# Patient Record
Sex: FEMALE | Race: WHITE | NOT HISPANIC OR LATINO | Employment: UNEMPLOYED | URBAN - METROPOLITAN AREA
[De-identification: names, ages, dates, MRNs, and addresses within clinical notes are randomized per-mention and may not be internally consistent; named-entity substitution may affect disease eponyms.]

---

## 2017-02-21 ENCOUNTER — APPOINTMENT (OUTPATIENT)
Dept: RADIOLOGY | Facility: MEDICAL CENTER | Age: 32
End: 2017-02-21
Attending: EMERGENCY MEDICINE
Payer: COMMERCIAL

## 2017-02-21 ENCOUNTER — HOSPITAL ENCOUNTER (EMERGENCY)
Facility: MEDICAL CENTER | Age: 32
End: 2017-02-21
Attending: EMERGENCY MEDICINE
Payer: COMMERCIAL

## 2017-02-21 VITALS
WEIGHT: 199.08 LBS | HEIGHT: 64 IN | BODY MASS INDEX: 33.99 KG/M2 | OXYGEN SATURATION: 96 % | SYSTOLIC BLOOD PRESSURE: 102 MMHG | HEART RATE: 94 BPM | RESPIRATION RATE: 16 BRPM | DIASTOLIC BLOOD PRESSURE: 66 MMHG | TEMPERATURE: 98.4 F

## 2017-02-21 DIAGNOSIS — R10.11 RIGHT UPPER QUADRANT ABDOMINAL PAIN: ICD-10-CM

## 2017-02-21 LAB
ALBUMIN SERPL BCP-MCNC: 2.4 G/DL (ref 3.2–4.9)
ALBUMIN/GLOB SERPL: 0.8 G/DL
ALP SERPL-CCNC: 74 U/L (ref 30–99)
ALT SERPL-CCNC: 22 U/L (ref 2–50)
ANION GAP SERPL CALC-SCNC: 7 MMOL/L (ref 0–11.9)
APPEARANCE UR: CLEAR
AST SERPL-CCNC: 22 U/L (ref 12–45)
BASOPHILS # BLD AUTO: 0.2 % (ref 0–1.8)
BASOPHILS # BLD: 0.02 K/UL (ref 0–0.12)
BILIRUB SERPL-MCNC: 0.4 MG/DL (ref 0.1–1.5)
BILIRUB UR QL STRIP.AUTO: NEGATIVE
BUN SERPL-MCNC: 5 MG/DL (ref 8–22)
CALCIUM SERPL-MCNC: 8.8 MG/DL (ref 8.4–10.2)
CHLORIDE SERPL-SCNC: 102 MMOL/L (ref 96–112)
CO2 SERPL-SCNC: 25 MMOL/L (ref 20–33)
COLOR UR: YELLOW
CREAT SERPL-MCNC: 0.61 MG/DL (ref 0.5–1.4)
CULTURE IF INDICATED INDCX: NO UA CULTURE
EOSINOPHIL # BLD AUTO: 0.11 K/UL (ref 0–0.51)
EOSINOPHIL NFR BLD: 1.3 % (ref 0–6.9)
ERYTHROCYTE [DISTWIDTH] IN BLOOD BY AUTOMATED COUNT: 39.7 FL (ref 35.9–50)
GFR SERPL CREATININE-BSD FRML MDRD: >60 ML/MIN/1.73 M 2
GLOBULIN SER CALC-MCNC: 3.1 G/DL (ref 1.9–3.5)
GLUCOSE SERPL-MCNC: 105 MG/DL (ref 65–99)
GLUCOSE UR STRIP.AUTO-MCNC: NEGATIVE MG/DL
HCT VFR BLD AUTO: 35.3 % (ref 37–47)
HGB BLD-MCNC: 12.6 G/DL (ref 12–16)
IMM GRANULOCYTES # BLD AUTO: 0.08 K/UL (ref 0–0.11)
IMM GRANULOCYTES NFR BLD AUTO: 0.9 % (ref 0–0.9)
KETONES UR STRIP.AUTO-MCNC: NEGATIVE MG/DL
LEUKOCYTE ESTERASE UR QL STRIP.AUTO: NEGATIVE
LIPASE SERPL-CCNC: 37 U/L (ref 7–58)
LYMPHOCYTES # BLD AUTO: 1.19 K/UL (ref 1–4.8)
LYMPHOCYTES NFR BLD: 13.9 % (ref 22–41)
MCH RBC QN AUTO: 31.2 PG (ref 27–33)
MCHC RBC AUTO-ENTMCNC: 35.7 G/DL (ref 33.6–35)
MCV RBC AUTO: 87.4 FL (ref 81.4–97.8)
MICRO URNS: NORMAL
MONOCYTES # BLD AUTO: 0.37 K/UL (ref 0–0.85)
MONOCYTES NFR BLD AUTO: 4.3 % (ref 0–13.4)
NEUTROPHILS # BLD AUTO: 6.77 K/UL (ref 2–7.15)
NEUTROPHILS NFR BLD: 79.4 % (ref 44–72)
NITRITE UR QL STRIP.AUTO: NEGATIVE
NRBC # BLD AUTO: 0 K/UL
NRBC BLD AUTO-RTO: 0 /100 WBC
PH UR STRIP.AUTO: 5.5 [PH]
PLATELET # BLD AUTO: 178 K/UL (ref 164–446)
PMV BLD AUTO: 10.6 FL (ref 9–12.9)
POTASSIUM SERPL-SCNC: 3.4 MMOL/L (ref 3.6–5.5)
PROT SERPL-MCNC: 5.5 G/DL (ref 6–8.2)
PROT UR QL STRIP: NEGATIVE MG/DL
RBC # BLD AUTO: 4.04 M/UL (ref 4.2–5.4)
RBC UR QL AUTO: NEGATIVE
SODIUM SERPL-SCNC: 134 MMOL/L (ref 135–145)
SP GR UR STRIP.AUTO: 1.02
WBC # BLD AUTO: 8.5 K/UL (ref 4.8–10.8)

## 2017-02-21 PROCEDURE — 99284 EMERGENCY DEPT VISIT MOD MDM: CPT

## 2017-02-21 PROCEDURE — 76705 ECHO EXAM OF ABDOMEN: CPT

## 2017-02-21 PROCEDURE — 81003 URINALYSIS AUTO W/O SCOPE: CPT

## 2017-02-21 PROCEDURE — 80053 COMPREHEN METABOLIC PANEL: CPT

## 2017-02-21 PROCEDURE — 76815 OB US LIMITED FETUS(S): CPT

## 2017-02-21 PROCEDURE — 85025 COMPLETE CBC W/AUTO DIFF WBC: CPT

## 2017-02-21 PROCEDURE — 83690 ASSAY OF LIPASE: CPT

## 2017-02-21 ASSESSMENT — PAIN SCALES - GENERAL: PAINLEVEL_OUTOF10: 2

## 2017-02-21 NOTE — ED AVS SNAPSHOT
Home Care Instructions                                                                                                                Esther Gomez   MRN: [id number]    Department:  [facility], Emergency Dept   Date of Visit:  2/21/2017            [facility], Emergency Dept    [address]    [place] [address]    Phone:  [phone]      You were seen by     Cuong Iraheta M.D.      Your Diagnosis Was     Right upper quadrant abdominal pain     R10.11       Follow-up Information     1. Follow up with [facility], Emergency Dept.    Specialty:  Emergency Medicine    Why:  If symptoms worsen    Contact information    [address] [place] [address] [phone]      Medication Information     Review all of your home medications and newly ordered medications with your primary doctor and/or pharmacist as soon as possible. Follow medication instructions as directed by your doctor and/or pharmacist.     Please keep your complete medication list with you and share with your physician. Update the information when medications are discontinued, doses are changed, or new medications (including over-the-counter products) are added; and carry medication information at all times in the event of emergency situations.               Medication List      ASK your doctor about these medications        Instructions    PRENA-CAP PO    Take  by mouth.               Procedures and tests performed during your visit     CBC WITH DIFFERENTIAL    COMP METABOLIC PANEL    ESTIMATED GFR    LIPASE    URINALYSIS CULTURE, IF INDICATED    US-GALLBLADDER    US-OB LIMITED TRANSABDOMINAL        Discharge Instructions       Third Trimester of Pregnancy  The third trimester is from week 29 through week 42, months 7 through 9. The third trimester is a time when the fetus is growing rapidly. At the end of the ninth month, the fetus is about 20  "inches in length and weighs 6-10 pounds.   BODY CHANGES  Your body goes through many changes during pregnancy. The changes vary from woman to woman.   · Your weight will continue to increase. You can expect to gain 25-35 pounds (11-16 kg) by the end of the pregnancy.  · You may begin to get stretch marks on your hips, abdomen, and breasts.  · You may urinate more often because the fetus is moving lower into your pelvis and pressing on your bladder.  · You may develop or continue to have heartburn as a result of your pregnancy.  · You may develop constipation because certain hormones are causing the muscles that push waste through your intestines to slow down.  · You may develop hemorrhoids or swollen, bulging veins (varicose veins).  · You may have pelvic pain because of the weight gain and pregnancy hormones relaxing your joints between the bones in your pelvis. Backaches may result from overexertion of the muscles supporting your posture.  · You may have changes in your hair. These can include thickening of your hair, rapid growth, and changes in texture. Some women also have hair loss during or after pregnancy, or hair that feels dry or thin. Your hair will most likely return to normal after your baby is born.  · Your breasts will continue to grow and be tender. A yellow discharge may leak from your breasts called colostrum.  · Your belly button may stick out.  · You may feel short of breath because of your expanding uterus.  · You may notice the fetus \"dropping,\" or moving lower in your abdomen.  · You may have a bloody mucus discharge. This usually occurs a few days to a week before labor begins.  · Your cervix becomes thin and soft (effaced) near your due date.  WHAT TO EXPECT AT YOUR PRENATAL EXAMS   You will have prenatal exams every 2 weeks until week 36. Then, you will have weekly prenatal exams. During a routine prenatal visit:  · You will be weighed to make sure you and the fetus are growing " normally.  · Your blood pressure is taken.  · Your abdomen will be measured to track your baby's growth.  · The fetal heartbeat will be listened to.  · Any test results from the previous visit will be discussed.  · You may have a cervical check near your due date to see if you have effaced.  At around 36 weeks, your caregiver will check your cervix. At the same time, your caregiver will also perform a test on the secretions of the vaginal tissue. This test is to determine if a type of bacteria, Group B streptococcus, is present. Your caregiver will explain this further.  Your caregiver may ask you:  · What your birth plan is.  · How you are feeling.  · If you are feeling the baby move.  · If you have had any abnormal symptoms, such as leaking fluid, bleeding, severe headaches, or abdominal cramping.  · If you are using any tobacco products, including cigarettes, chewing tobacco, and electronic cigarettes.  · If you have any questions.  Other tests or screenings that may be performed during your third trimester include:  · Blood tests that check for low iron levels (anemia).  · Fetal testing to check the health, activity level, and growth of the fetus. Testing is done if you have certain medical conditions or if there are problems during the pregnancy.  · HIV (human immunodeficiency virus) testing. If you are at high risk, you may be screened for HIV during your third trimester of pregnancy.  FALSE LABOR  You may feel small, irregular contractions that eventually go away. These are called García López contractions, or false labor. Contractions may last for hours, days, or even weeks before true labor sets in. If contractions come at regular intervals, intensify, or become painful, it is best to be seen by your caregiver.   SIGNS OF LABOR   · Menstrual-like cramps.  · Contractions that are 5 minutes apart or less.  · Contractions that start on the top of the uterus and spread down to the lower abdomen and back.  · A  sense of increased pelvic pressure or back pain.  · A watery or bloody mucus discharge that comes from the vagina.  If you have any of these signs before the 37th week of pregnancy, call your caregiver right away. You need to go to the hospital to get checked immediately.  HOME CARE INSTRUCTIONS   · Avoid all smoking, herbs, alcohol, and unprescribed drugs. These chemicals affect the formation and growth of the baby.  · Do not use any tobacco products, including cigarettes, chewing tobacco, and electronic cigarettes. If you need help quitting, ask your health care provider. You may receive counseling support and other resources to help you quit.  · Follow your caregiver's instructions regarding medicine use. There are medicines that are either safe or unsafe to take during pregnancy.  · Exercise only as directed by your caregiver. Experiencing uterine cramps is a good sign to stop exercising.  · Continue to eat regular, healthy meals.  · Wear a good support bra for breast tenderness.  · Do not use hot tubs, steam rooms, or saunas.  · Wear your seat belt at all times when driving.  · Avoid raw meat, uncooked cheese, cat litter boxes, and soil used by cats. These carry germs that can cause birth defects in the baby.  · Take your prenatal vitamins.  · Take 3367-2998 mg of calcium daily starting at the 20th week of pregnancy until you deliver your baby.  · Try taking a stool softener (if your caregiver approves) if you develop constipation. Eat more high-fiber foods, such as fresh vegetables or fruit and whole grains. Drink plenty of fluids to keep your urine clear or pale yellow.  · Take warm sitz baths to soothe any pain or discomfort caused by hemorrhoids. Use hemorrhoid cream if your caregiver approves.  · If you develop varicose veins, wear support hose. Elevate your feet for 15 minutes, 3-4 times a day. Limit salt in your diet.  · Avoid heavy lifting, wear low heal shoes, and practice good posture.  · Rest a lot  with your legs elevated if you have leg cramps or low back pain.  · Visit your dentist if you have not gone during your pregnancy. Use a soft toothbrush to brush your teeth and be gentle when you floss.  · A sexual relationship may be continued unless your caregiver directs you otherwise.  · Do not travel far distances unless it is absolutely necessary and only with the approval of your caregiver.  · Take prenatal classes to understand, practice, and ask questions about the labor and delivery.  · Make a trial run to the hospital.  · Pack your hospital bag.  · Prepare the baby's nursery.  · Continue to go to all your prenatal visits as directed by your caregiver.  SEEK MEDICAL CARE IF:  · You are unsure if you are in labor or if your water has broken.  · You have dizziness.  · You have mild pelvic cramps, pelvic pressure, or nagging pain in your abdominal area.  · You have persistent nausea, vomiting, or diarrhea.  · You have a bad smelling vaginal discharge.  · You have pain with urination.  SEEK IMMEDIATE MEDICAL CARE IF:   · You have a fever.  · You are leaking fluid from your vagina.  · You have spotting or bleeding from your vagina.  · You have severe abdominal cramping or pain.  · You have rapid weight loss or gain.  · You have shortness of breath with chest pain.  · You notice sudden or extreme swelling of your face, hands, ankles, feet, or legs.  · You have not felt your baby move in over an hour.  · You have severe headaches that do not go away with medicine.  · You have vision changes.     This information is not intended to replace advice given to you by your health care provider. Make sure you discuss any questions you have with your health care provider.     Document Released: 12/12/2002 Document Revised: 01/08/2016 Document Reviewed: 02/18/2014  [employer] Interactive Patient Education ©2016 [employer]            Patient Information     Patient Information    Following emergency treatment: all patient  requiring follow-up care must return either to a private physician or a clinic if your condition worsens before you are able to obtain further medical attention, please return to the emergency room.     Billing Information    At [facility], we work to make the billing process streamlined for our patients.  Our Representatives are here to answer any questions you may have regarding your hospital bill.  If you have insurance coverage and have supplied your insurance information to us, we will submit a claim to your insurer on your behalf.  Should you have any questions regarding your bill, we can be reached online or by phone as follows:  Online: You are able pay your bills online or live chat with our representatives about any billing questions you may have. We are here to help Monday - Friday from 8:00am to 7:30pm and 9:00am - 12:00pm on Saturdays.  Please visit [email or web address]  for more information.   Phone:  [phone] or [phone]    Please note that your emergency physician, surgeon, pathologist, radiologist, anesthesiologist, and other specialists are not employed by [facility] and will therefore bill separately for their services.  Please contact them directly for any questions concerning their bills at the numbers below:     Emergency Physician Services:  [phone]  [facility]:  [phone]  [facility]:  [phone]  [facility]:  [phone]    1. Your final bill may vary from the amount quoted upon discharge if all procedures are not complete at that time, or if your doctor has additional procedures of which we are not aware. You will receive an additional bill if you return to the Emergency Department at [facility] for suture removal regardless of the facility of which the sutures were placed.     2. Please arrange for settlement of this account at the emergency registration.    3. All self-pay accounts  are due in full at the time of treatment.  If you are unable to meet this obligation then payment is expected within 4-5 days.     4. If you have had radiology studies (CT, X-ray, Ultrasound, MRI), you have received a preliminary result during your emergency department visit. Please contact the radiology department [phone] to receive a copy of your final result. Please discuss the Final result with your primary physician or with the follow up physician provided.     Crisis Hotline:  [place] Crisis Hotline:  1-556-PJKJZKK or 1-912.138.2504  [place] Crisis Hotline:    1-163.993.6553 or 053-505-3070         ED Discharge Follow Up Questions    1. In order to provide you with very good care, we would like to follow up with a phone call in the next few days.  May we have your permission to contact you?     YES /  NO    2. What is the best phone number to call you? (       )_____-__________    3. What is the best time to call you?      Morning  /  Afternoon  /  Evening                   Patient Signature:  ____________________________________________________________    Date:  ____________________________________________________________

## 2017-02-21 NOTE — ED AVS SNAPSHOT
2/21/2017          Esther Gomez  [address] [address]    Dear Esther:    [facility] wants to ensure your discharge home is safe and you or your loved ones have had all your questions answered regarding your care after you leave the hospital.    You may receive a telephone call within two days of your discharge.  This call is to make certain you understand your discharge instructions as well as ensure we provided you with the best care possible during your stay with us.     The call will only last approximately 3-5 minutes and will be done by a nurse.    Once again, we want to ensure your discharge home is safe and that you have a clear understanding of any next steps in your care.  If you have any questions or concerns, please do not hesitate to contact us, we are here for you.  Thank you for choosing [facility] for your healthcare needs.    Sincerely,    Addison Fairbanks    [facility]

## 2017-02-21 NOTE — ED AVS SNAPSHOT
Xpreso Access Code: [email or web address]  Expires: 3/23/2017  9:28 PM    Xpreso  A secure, online tool to manage your health information     [employer]’s Xpreso® is a secure, online tool that connects you to your personalized health information from the privacy of your home -- day or night - making it very easy for you to manage your healthcare. Once the activation process is completed, you can even access your medical information using the Xpreso toby, which is available for free in the Apple Toby store or Google Play store.     Xpreso provides the following levels of access (as shown below):   My Chart Features   [facility] Primary Care Doctor [facility]  Specialists [facility]  Urgent  Care Non-[facility]  Primary Care  Doctor   Email your healthcare team securely and privately 24/7 X X X X   Manage appointments: schedule your next appointment; view details of past/upcoming appointments X      Request prescription refills. X      View recent personal medical records, including lab and immunizations X X X X   View health record, including health history, allergies, medications X X X X   Read reports about your outpatient visits, procedures, consult and ER notes X X X X   See your discharge summary, which is a recap of your hospital and/or ER visit that includes your diagnosis, lab results, and care plan. X X       How to register for Xpreso:  1. Go to  [email or web address].  2. Click on the Sign Up Now box, which takes you to the New Member Sign Up page. You will need to provide the following information:  a. Enter your Xpreso Access Code exactly as it appears at the top of this page. (You will not need to use this code after you’ve completed the sign-up process. If you do not sign up before the expiration date, you must request a new code.)   b. Enter your date of birth.   c. Enter your home email address.   d. Click Submit, and follow the next screen’s instructions.  3. Create a Xpreso ID. This will be your Xpreso  login ID and cannot be changed, so think of one that is secure and easy to remember.  4. Create a [employer] password. You can change your password at any time.  5. Enter your Password Reset Question and Answer. This can be used at a later time if you forget your password.   6. Enter your e-mail address. This allows you to receive e-mail notifications when new information is available in [employer].  7. Click Sign Up. You can now view your health information.    For assistance activating your [employer] account, call [phone]

## 2017-02-22 NOTE — ED NOTES
Dc instructions given along with copy of results. To f/u with pcp, return for worsening s/s, to [facility] if Obstetrical issues

## 2017-02-22 NOTE — ED NOTES
Seen and examined by erp. Orders recvd. Pt and spouse aware of plan including need for urine spec In no apparent distress

## 2017-02-22 NOTE — ED PROVIDER NOTES
ED Provider Note    CHIEF COMPLAINT  Right upper quadrant pain    HPI  Esther Gomez is a 31 y.o. female who presents with a chief complaint of right upper quadrant pain it is in the right upper quadrant near the xiphoid area. Duration has been for about a few weeks. Initially and it is still worse when she transferred reached and turn. She also can palpate a specific area. When she palpates the area actually makes her feel better. There is no vomiting there is no trouble breathing. It is not specifically pleuritic. There is no leg swelling. No masses noted.    Patient is 7 months pregnant there are here from [place]. They live in a trailer. Her  is doing construction. She is had no vaginal bleeding. She's had her prenatal care in [place]. She denies any gallbladder issues. She denies any GI issues.    REVIEW OF SYSTEMS  General: No fever or chills.  Eyes: No eye discharge. No eye pain.  Ear nose throat: Mild sore throat no trouble swallowing positive congestion  Pulmonary: No shortness of breath or cough.  Cardiovascular: No chest pain or chest pressure.  GI: See above  : No dysuria or hematuria  Dermatologic: No rashes. No abrasions.  Neurologic: No weakness or numbness.      All other systems are negative      PAST MEDICAL HISTORY  History reviewed. No pertinent past medical history.    FAMILY HISTORY  History reviewed. No pertinent family history.    SOCIAL HISTORY  Social History     Social History   • Marital Status:      Spouse Name: N/A   • Number of Children: N/A   • Years of Education: N/A     Social History Main Topics   • Smoking status: Never Smoker    • Smokeless tobacco: Never Used   • Alcohol Use: No   • Drug Use: No   • Sexual Activity: Not Asked     Other Topics Concern   • None     Social History Narrative   • None       SURGICAL HISTORY  Past Surgical History   Procedure Laterality Date   • Other         CURRENT MEDICATIONS  Home Medications     Reviewed by Rigo SMITH  "ELENITA Brown (Registered Nurse) on 02/21/17 at 1805  Med List Status: Complete    Medication Last Dose Status    Prenatal Vit-DSS-Fe Cbn-FA (PRENA-CAP PO) 2/21/2017 Active                 ALLERGIES  No Known Allergies    PHYSICAL EXAM  VITAL SIGNS: /66 mmHg  Pulse 74  Temp(Src) 36.9 °C (98.4 °F)  Resp 16  Ht 1.626 m (5' 4\")  Wt 90.3 kg (199 lb 1.2 oz)  BMI 34.15 kg/m2  SpO2 98%  Constitutional: Well developed, Well nourished, No acute distress, Non-toxic appearance.   HENT: Normocephalic, Atraumatic, Bilateral external ears normal, Oropharynx moist, No oral exudates, Nose normal.   Eyes: PERRLA, EOMI, Conjunctiva normal, No discharge.   Musculoskeletal: Neck has normal range of motion, No tenderness, Supple.   Lymphatic: No cervical lymphadenopathy noted.   Cardiovascular: Normal heart rate, Normal rhythm, No murmurs, No rubs, No gallops.   Thorax & Lungs: Normal breath sounds, No respiratory distress, No wheezing, No chest tenderness.   Abdomen: Nondistended nontender there's question of tenderness just below the right central and right upper quadrant ribs. I cannot reproduce rib wall tenderness. However there is negative Dodson's negative guarding negative rebound.  Skin: Warm, Dry, No erythema, No rash.   : No CVA tenderness.   Psychiatric: Calm, not anxious  Neurologic: Alert & oriented, moves all extremities equally    RADIOLOGY/PROCEDURES  Results for orders placed or performed during the hospital encounter of 02/21/17   CBC WITH DIFFERENTIAL   Result Value Ref Range    WBC 8.5 4.8 - 10.8 K/uL    RBC 4.04 (L) 4.20 - 5.40 M/uL    Hemoglobin 12.6 12.0 - 16.0 g/dL    Hematocrit 35.3 (L) 37.0 - 47.0 %    MCV 87.4 81.4 - 97.8 fL    MCH 31.2 27.0 - 33.0 pg    MCHC 35.7 (H) 33.6 - 35.0 g/dL    RDW 39.7 35.9 - 50.0 fL    Platelet Count 178 164 - 446 K/uL    MPV 10.6 9.0 - 12.9 fL    Neutrophils-Polys 79.40 (H) 44.00 - 72.00 %    Lymphocytes 13.90 (L) 22.00 - 41.00 %    Monocytes 4.30 0.00 - 13.40 %    " Eosinophils 1.30 0.00 - 6.90 %    Basophils 0.20 0.00 - 1.80 %    Immature Granulocytes 0.90 0.00 - 0.90 %    Nucleated RBC 0.00 /100 WBC    Neutrophils (Absolute) 6.77 2.00 - 7.15 K/uL    Lymphs (Absolute) 1.19 1.00 - 4.80 K/uL    Monos (Absolute) 0.37 0.00 - 0.85 K/uL    Eos (Absolute) 0.11 0.00 - 0.51 K/uL    Baso (Absolute) 0.02 0.00 - 0.12 K/uL    Immature Granulocytes (abs) 0.08 0.00 - 0.11 K/uL    NRBC (Absolute) 0.00 K/uL   COMP METABOLIC PANEL   Result Value Ref Range    Sodium 134 (L) 135 - 145 mmol/L    Potassium 3.4 (L) 3.6 - 5.5 mmol/L    Chloride 102 96 - 112 mmol/L    Co2 25 20 - 33 mmol/L    Anion Gap 7.0 0.0 - 11.9    Glucose 105 (H) 65 - 99 mg/dL    Bun 5 (L) 8 - 22 mg/dL    Creatinine 0.61 0.50 - 1.40 mg/dL    Calcium 8.8 8.4 - 10.2 mg/dL    AST(SGOT) 22 12 - 45 U/L    ALT(SGPT) 22 2 - 50 U/L    Alkaline Phosphatase 74 30 - 99 U/L    Total Bilirubin 0.4 0.1 - 1.5 mg/dL    Albumin 2.4 (L) 3.2 - 4.9 g/dL    Total Protein 5.5 (L) 6.0 - 8.2 g/dL    Globulin 3.1 1.9 - 3.5 g/dL    A-G Ratio 0.8 g/dL   LIPASE   Result Value Ref Range    Lipase 37 7 - 58 U/L   URINALYSIS CULTURE, IF INDICATED   Result Value Ref Range    Color Yellow     Character Clear     Specific Gravity 1.020 <1.035    Ph 5.5 5.0-8.0    Glucose Negative Negative mg/dL    Ketones Negative Negative mg/dL    Protein Negative Negative mg/dL    Bilirubin Negative Negative    Nitrite Negative Negative    Leukocyte Esterase Negative Negative    Occult Blood Negative Negative    Micro Urine Req see below     Culture Indicated No UA Culture   ESTIMATED GFR   Result Value Ref Range    GFR If African American >60 >60 mL/min/1.73 m 2    GFR If Non African American >60 >60 mL/min/1.73 m 2      US-OB LIMITED TRANSABDOMINAL   Final Result         1.  Single intrauterine pregnancy of an estimated gestational age of 33 weeks 6 days with an estimated date of delivery of April 5, 2017.   2.  Size greater than dates, recommend follow-up sonogram in 2-4  weeks for evaluation of interval growth.      US-GALLBLADDER   Final Result         1.  Gallbladder sludge, otherwise unremarkable gallbladder ultrasound.               COURSE & MEDICAL DECISION MAKING  Pertinent Labs & Imaging studies reviewed. (See chart for details)  She's here with right upper quadrant pain gallbladder, musculoskeletal, GERD, liver disease among others noted. Also should rule out kidney infection or kidney stone    Her exam is really unremarkable for anything on finding I suspect is musculoskeletal from her history and physical even though I cannot reproduce it on the rib itself. We did get very close to the costochondral junction patient did have some reproducible pain but was very slight and minimal. No elevated Liver enzymes. No hypertension. No signs of preeclampsia    Repeat exam ×2 has been unremarkable.    Ltrasound of the gallbladder shows some sludge but no infection. Labwork unremarkable. Urine is negative.    Patient has normal findings looks otherwise well has had 3 repeated exams with minimal to no tenderness. This point the patient be safely discharged home I have warned her that we don't have a good idea what her pain is being caused by I suspected to be musculoskeletal but cannot understand exactly why. She has negative Homans negative course no signs of leg swelling that is significant to diagnosed DVT PE. As for the sore throat and congestion I recommend either Tylenol or Benadryl as they are both class B medications. I recommend avoiding cold cough and cold medication. No Motrin    FINAL IMPRESSION  1. Abdominal pain  2. Upper respiratory infection  3.      Electronically signed by: Cuong Iraheta, 2/21/2017 6:51 PM

## 2017-02-22 NOTE — ED NOTES
7 months pregnant, visiting here from [place], has had epigastric tiff x 3 weeks, worse with stretching or on palpation, also sore throat and cough x 3 days
